# Patient Record
Sex: FEMALE | Race: WHITE
[De-identification: names, ages, dates, MRNs, and addresses within clinical notes are randomized per-mention and may not be internally consistent; named-entity substitution may affect disease eponyms.]

---

## 2019-01-29 ENCOUNTER — HOSPITAL ENCOUNTER (EMERGENCY)
Dept: HOSPITAL 97 - ER | Age: 39
Discharge: HOME | End: 2019-01-29
Payer: SELF-PAY

## 2019-01-29 DIAGNOSIS — J06.9: ICD-10-CM

## 2019-01-29 DIAGNOSIS — J20.8: Primary | ICD-10-CM

## 2019-01-29 DIAGNOSIS — Z88.0: ICD-10-CM

## 2019-01-29 PROCEDURE — 71046 X-RAY EXAM CHEST 2 VIEWS: CPT

## 2019-01-29 PROCEDURE — 99284 EMERGENCY DEPT VISIT MOD MDM: CPT

## 2019-01-29 NOTE — ER
Nurse's Notes                                                                                     

 [facility]                                                                

Name: Eda Reyes                                                                              

Age: 38 yrs                                                                                       

Sex: Female                                                                                       

: 1980                                                                                   

MRN: [id number]                                                                                   

Arrival Date: 2019                                                                          

Time: 13:07                                                                                       

Account#: [id number]                                                                            

Bed 23                                                                                            

Private MD: None, None                                                                            

Diagnosis: Acute Viral Upper Respiratory Infection;Acute Viral Bronchitis                         

                                                                                                  

Presentation:                                                                                     

                                                                                             

13:09 Presenting complaint: Patient states: i have been battling this cough and now i have    tw2 

      this congestion in my chest, +cough, scratchy throat. Transition of care: patient was       

      not received from another setting of care. Onset of symptoms was 2019. Risk     

      Assessment: Do you want to hurt yourself or someone else? Patient reports no desire to      

      harm self or others. Initial Sepsis Screen: Does the patient meet any 2 criteria? No.       

      Patient's initial sepsis screen is negative. Does the patient have a suspected source       

      of infection? No. Patient's initial sepsis screen is negative. Care prior to arrival:       

      None.                                                                                       

13:09 Method Of Arrival: Ambulatory                                                           tw2 

13:09 Acuity: PRAMOD 4                                                                           tw2 

                                                                                                  

Triage Assessment:                                                                                

13:11 General: Appears in no apparent distress. Behavior is calm, cooperative, appropriate    tw2 

      for age. Pain: Complains of pain in throat.                                                 

                                                                                                  

OB/GYN:                                                                                           

13:11 LMP 2019                                                                           tw2 

                                                                                                  

Historical:                                                                                       

- Allergies:                                                                                      

13:11 PENICILLINS;                                                                            tw2 

- Home Meds:                                                                                      

13:11 None [Active];                                                                          tw2 

- PSHx:                                                                                           

13:11 ; Tubal ligation;                                                              tw2 

                                                                                                  

- Immunization history:: Adult Immunizations.                                                     

- Social history:: Smoking status: Patient/guardian denies using tobacco, the patient             

  reports quitting approximately 3 years ago.                                                     

- Ebola Screening: : Patient denies travel to an Ebola-affected area in the 21 days               

  before illness onset.                                                                           

- Family history:: not pertinent.                                                                 

- Hospitalizations: : No recent hospitalization is reported.                                      

                                                                                                  

                                                                                                  

Screenin:52 Abuse screen: Denies threats or abuse. Nutritional screening: No deficits noted.        tl3 

      Tuberculosis screening: No symptoms or risk factors identified. Fall Risk None              

      identified.                                                                                 

                                                                                                  

Assessment:                                                                                       

13:52 General: Appears in no apparent distress. comfortable, slender, well groomed, well      tl3 

      developed, well nourished, Behavior is calm, cooperative, appropriate for age. Pain:        

      Complains of pain in chest with cough. Neuro: No deficits noted. Level of Consciousness     

      is awake, alert, obeys commands, Oriented to person, place, time, situation,                

      Appropriate for age. Cardiovascular: Heart tones S1 S2 present Patient's skin is warm       

      and dry. Respiratory: Reports air hunger Breath sounds are clear bilaterally. GI: No        

      signs and/or symptoms were reported involving the gastrointestinal system. : No signs     

      and/or symptoms were reported regarding the genitourinary system. EENT: No signs and/or     

      symptoms were reported regarding the EENT system. Derm: No signs and/or symptoms            

      reported regarding the dermatologic system. Musculoskeletal: No signs and/or symptoms       

      reported regarding the musculoskeletal system.                                              

14:18 Reassessment: Patient appears in no apparent distress at this time. No changes from     tl3 

      previously documented assessment. Patient and/or family updated on plan of care and         

      expected duration. Pain level reassessed. Patient is alert, oriented x 3, equal             

      unlabored respirations, skin warm/dry/pink. awaiting results of x-ray.                      

14:59 Reassessment: Patient appears in no apparent distress at this time. No changes from     tl3 

      previously documented assessment. Patient and/or family updated on plan of care and         

      expected duration. Pain level reassessed. Patient is alert, oriented x 3, equal             

      unlabored respirations, skin warm/dry/pink.                                                 

                                                                                                  

Vital Signs:                                                                                      

13:11  / 68; Pulse 76; Resp 18; Temp 97.6(TE); Pulse Ox 98% on R/A; Weight 58.97 kg     tw2 

      (R); Height 5 ft. 4 in. (162.56 cm); Pain 0/10;                                             

14:18  / 67; Pulse 74; Resp 18; Pulse Ox 97% on R/A;                                    tl3 

14:59  / 64; Pulse 67; Resp 18; Pulse Ox 99% ;                                          tl3 

13:11 Body Mass Index 22.31 (58.97 kg, 162.56 cm)                                             tw2 

                                                                                                  

ED Course:                                                                                        

13:07 Patient arrived in ED.                                                                  dl4 

13:07 None, None is Private Physician.                                                        dl4 

13:10 Triage completed.                                                                       tw2 

13:12 Arm band placed on.                                                                     tw2 

13:13 Sascha Keita MD is Attending Physician.                                             wa  

13:28 Jackelin Riggs RN is Primary Nurse.                                                     tl3 

13:28 ED physician to see patient. Dr Keita at bedside for assessment.                       tl3 

13:50 Chest Pa And Lat (2 Views) XRAY In Process Unspecified.                                 EDMS

13:52 Patient has correct armband on for positive identification. Bed in low position. Call   tl3 

      light in reach.                                                                             

13:52 No provider procedures requiring assistance completed. Patient did not have IV access   tl3 

      during this emergency room visit.                                                           

14:50 Aurelio Sanford DO is Referral Physician.                                              wa  

                                                                                                  

Administered Medications:                                                                         

13:45 Drug: Albuterol 2.5 mg Route: Inhalation;                                               tl3 

14:21 Follow up: Response: No adverse reaction                                                tl3 

13:46 Drug: AtroVENT Aerosol 0.5 mg Route: Inhalation;                                        tl3 

14:21 Follow up: Response: No adverse reaction                                                tl3 

14:41 Drug: predniSONE 40 mg Route: PO;                                                       tl3 

14:42 Follow up: Response: No adverse reaction                                                tl3 

                                                                                                  

                                                                                                  

Outcome:                                                                                          

14:51 Discharge ordered by MD.                                                                wa  

14:59 Discharged to home ambulatory.                                                          tl3 

14:59 Condition: stable                                                                           

14:59 Discharge instructions given to patient, Instructed on discharge instructions, follow       

      up and referral plans. Demonstrated understanding of instructions, follow-up care,          

      medications, Prescriptions given X 2.                                                       

15:08 Patient left the ED.                                                                    tl3 

                                                                                                  

Signatures:                                                                                       

Dispatcher [employer]                           EDMS                                                 

Melany George RN                          RN   tw2                                                  

Sascha Keita MD MD wa Lowrey, Tammy, RN                       RN   tl3                                                  

Aroldo Gerber                                  dl4                                                  

                                                                                                  

**************************************************************************************************

## 2019-01-29 NOTE — EDPHYS
Physician Documentation                                                                           

 [facility]                                                                

Name: Eda Reyes                                                                              

Age: 38 yrs                                                                                       

Sex: Female                                                                                       

: 1980                                                                                   

MRN: [id number]                                                                                   

Arrival Date: 2019                                                                          

Time: 13:07                                                                                       

Account#: [id number]                                                                            

Bed 23                                                                                            

Private MD: None, None                                                                            

ED Physician Sascha Keita                                                                      

HPI:                                                                                              

                                                                                             

13:40 This 38 yrs old  Female presents to ER via Ambulatory with complaints of       wa  

      Cough, Fever.                                                                               

13:40 The patient or guardian reports cough, that is constant, with no sputum, worse at       wa  

      night.. Onset: The symptoms/episode began/occurred 1 week(s) ago. Severity of symptoms:     

      At their worst the symptoms were moderate, in the emergency department the symptoms are     

      actually worse. Modifying factors: The symptoms are alleviated by nothing, the symptoms     

      are aggravated by nothing. Associated signs and symptoms: Pertinent positives:              

      rhinorrhea, congestion. The patient has not experienced similar symptoms in the past.       

      The patient has not recently seen a physician. taking OTC cough meds.                       

                                                                                                  

OB/GYN:                                                                                           

13:11 LMP 2019                                                                           tw2 

                                                                                                  

Historical:                                                                                       

- Allergies:                                                                                      

13:11 PENICILLINS;                                                                            tw2 

- Home Meds:                                                                                      

13:11 None [Active];                                                                          tw2 

- PSHx:                                                                                           

13:11 ; Tubal ligation;                                                              tw2 

                                                                                                  

- Immunization history:: Adult Immunizations.                                                     

- Social history:: Smoking status: Patient/guardian denies using tobacco, the patient             

  reports quitting approximately 3 years ago.                                                     

- Ebola Screening: : Patient denies travel to an Ebola-affected area in the 21 days               

  before illness onset.                                                                           

- Family history:: not pertinent.                                                                 

- Hospitalizations: : No recent hospitalization is reported.                                      

                                                                                                  

                                                                                                  

ROS:                                                                                              

13:42 Eyes: Negative for injury, pain, redness, and discharge, Neck: Negative for injury,     wa  

      pain, and swelling, Cardiovascular: Negative for chest pain, palpitations, and edema,       

      Abdomen/GI: Negative for abdominal pain, nausea, vomiting, diarrhea, and constipation,      

      Back: Negative for injury and pain, : Negative for injury, bleeding, discharge, and       

      swelling, MS/Extremity: Negative for injury and deformity, Skin: Negative for injury,       

      rash, and discoloration, Neuro: Negative for headache, weakness, numbness, tingling,        

      and seizure, Psych: Negative for depression, anxiety, suicide ideation, homicidal           

      ideation, and hallucinations.                                                               

13:42 Constitutional: Positive for fever.                                                         

13:42 ENT: Positive for rhinorrhea, sinus congestion.                                             

13:42 Respiratory: Positive for cough, with no reported sputum.                                   

                                                                                                  

Exam:                                                                                             

13:42 Head/Face:  Normocephalic, atraumatic. Eyes:  Pupils equal round and reactive to light, wa  

      extra-ocular motions intact.  Lids and lashes normal.  Conjunctiva and sclera are           

      non-icteric and not injected.  Cornea within normal limits.  Periorbital areas with no      

      swelling, redness, or edema. Neck:  Trachea midline, no thyromegaly or masses palpated,     

      and no cervical lymphadenopathy.  Supple, full range of motion without nuchal rigidity,     

      or vertebral point tenderness.  No Meningismus. Chest/axilla:  Normal chest wall            

      appearance and motion.  Nontender with no deformity.  No lesions are appreciated.           

      Cardiovascular:  Regular rate and rhythm with a normal S1 and S2.  No gallops, murmurs,     

      or rubs.  Normal PMI, no JVD.  No pulse deficits. Abdomen/GI:  Soft, non-tender, with       

      normal bowel sounds.  No distension or tympany.  No guarding or rebound.  No evidence       

      of tenderness throughout. Back:  No spinal tenderness.  No costovertebral tenderness.       

      Full range of motion. Skin:  Warm, dry with normal turgor.  Normal color with no            

      rashes, no lesions, and no evidence of cellulitis. MS/ Extremity:  Pulses equal, no         

      cyanosis.  Neurovascular intact.  Full, normal range of motion. Neuro:  Awake and           

      alert, GCS 15, oriented to person, place, time, and situation.  Cranial nerves II-XII       

      grossly intact.  Motor strength 5/5 in all extremities.  Sensory grossly intact.            

      Cerebellar exam normal.  Normal gait. Psych:  Awake, alert, with orientation to person,     

      place and time.  Behavior, mood, and affect are within normal limits.                       

13:42 Constitutional: The patient appears in no acute distress, alert, awake.                     

13:42 ENT: Ear canal(s): are normal, TM's: are normal, Nose: is normal, Examination of the        

      other nostril shows no obvious abnormality, , Posterior pharynx: is normal.                 

13:42 Respiratory: the patient does not display signs of respiratory distress,  Respirations:     

      normal, Breath sounds: wheezing: that is mild, is scattered, noted cough spells with        

      deep inspiration.                                                                           

                                                                                                  

Vital Signs:                                                                                      

13:11  / 68; Pulse 76; Resp 18; Temp 97.6(TE); Pulse Ox 98% on R/A; Weight 58.97 kg     tw2 

      (R); Height 5 ft. 4 in. (162.56 cm); Pain 0/10;                                             

14:18  / 67; Pulse 74; Resp 18; Pulse Ox 97% on R/A;                                    tl3 

14:59  / 64; Pulse 67; Resp 18; Pulse Ox 99% ;                                          tl3 

13:11 Body Mass Index 22.31 (58.97 kg, 162.56 cm)                                             tw2 

                                                                                                  

MDM:                                                                                              

13:13 Patient medically screened.                                                             wa  

13:43 Differential Diagnosis: Other URI symptoms post viral syndrome with possible post nasal wa  

      drip. will r/o pna. nebs, consider steroid burst if CXR neg for pna.                        

14:48 Data reviewed: vital signs, nurses notes. Test interpretation: by ED physician or       wa  

      midlevel provider: CXR negative. Response to treatment: the patient's symptoms have         

      markedly improved after treatment. ED course: will d/c with MDI and short burst of          

      prednisone. first dose given in ED. advised benadryl/cetirizine prn. .                      

                                                                                                  

                                                                                             

13:32 Order name: Chest Pa And Lat (2 Views) XRAY; Complete Time: 14:34                       wa  

                                                                                                  

Administered Medications:                                                                         

13:45 Drug: Albuterol 2.5 mg Route: Inhalation;                                               tl3 

14:21 Follow up: Response: No adverse reaction                                                tl3 

13:46 Drug: AtroVENT Aerosol 0.5 mg Route: Inhalation;                                        tl3 

14:21 Follow up: Response: No adverse reaction                                                tl3 

14:41 Drug: predniSONE 40 mg Route: PO;                                                       tl3 

14:42 Follow up: Response: No adverse reaction                                                tl3 

                                                                                                  

                                                                                                  

Disposition:                                                                                      

19 14:51 Discharged to Home. Impression: Acute Viral Upper Respiratory Infection, Acute     

  Viral Bronchitis.                                                                               

- Condition is Stable.                                                                            

- Discharge Instructions: Acute Bronchitis, Easy-to-Read.                                         

- Prescriptions for Prednisone 20 mg Oral Tablet - take 2 tablets by ORAL route once              

  daily for 4 days; 8 tablet. Albuterol Sulfate 90 mcg/actuation Inhalation - inhale              

  1-2 puff by INHALATION route every 2 hours; 1 Inhaler.                                          

- Medication Reconciliation Form, Thank You Letter, Antibiotic Education, Prescription            

  Opioid Use, Work release form form.                                                             

- Follow up: Aurelio Sanford DO; When: 2 - 3 days; Reason: Recheck today's complaints.           

- Problem is new.                                                                                 

- Symptoms have improved.                                                                         

- Notes: take medication as prescribed. you may take zyrtec for congestion as needed.             

  do not take medication that contain sudafed                                                     

                                                                                                  

                                                                                                  

Signatures:                                                                                       

Dispatcher [employer]                           EDMelany Hargrove, RN                          RN   tw2                                                  

Sascha Keita MD MD   wa                                                   

Jackelin Riggs RN                       RN   tl3                                                  

                                                                                                  

Corrections: (The following items were deleted from the chart)                                    

15:08 14:51 2019 14:51 Discharged to Home. Impression: Acute Viral Upper Respiratory    tl3 

      Infection; Acute Viral Bronchitis. Condition is Stable. Forms are Medication                

      Reconciliation Form, Thank You Letter, Antibiotic Education, Prescription Opioid Use.       

      Follow up: Aurelio Sanford; When: 2 - 3 days; Reason: Recheck today's complaints.            

      Problem is new. Symptoms have improved. wa                                                  

                                                                                                  

**************************************************************************************************

## 2019-01-29 NOTE — RAD REPORT
EXAM DESCRIPTION:  RAD - Chest Pa And Lat (2 Views) - 1/29/2019 1:49 pm

 

CLINICAL HISTORY:  Cough and congestion

 

COMPARISON:  None.

 

TECHNIQUE:  PA and lateral views of the chest were obtained.

 

FINDINGS:  The lungs are clear.   Heart size is normal and central vasculature is within normal limit
s.  No pleural effusion or pneumothorax seen.  No acute bony finding noted.  No aortic abnormality.

 

IMPRESSION:  No acute cardiopulmonary process.

## 2019-06-10 ENCOUNTER — HOSPITAL ENCOUNTER (EMERGENCY)
Dept: HOSPITAL 97 - ER | Age: 39
Discharge: HOME | End: 2019-06-10
Payer: SELF-PAY

## 2019-06-10 DIAGNOSIS — D17.0: Primary | ICD-10-CM

## 2019-06-10 DIAGNOSIS — Z88.0: ICD-10-CM

## 2019-06-10 PROCEDURE — 99282 EMERGENCY DEPT VISIT SF MDM: CPT

## 2019-06-10 NOTE — EDPHYS
Physician Documentation                                                                           

 [facility]                                                                 

Name: Eda Reyes                                                                              

Age: 39 yrs                                                                                       

Sex: Female                                                                                       

: 1980                                                                                   

MRN: [id number]                                                                                   

Arrival Date: 06/10/2019                                                                          

Time: 13:38                                                                                       

Account#: [id number]                                                                            

Bed 11                                                                                            

Private MD: None, None                                                                            

ED Physician Ubaldo Greer                                                                      

HPI:                                                                                              

06/10                                                                                             

14:44 This 39 yrs old  Female presents to ER via Ambulatory with complaints of Cyst. [facility] 

14:44 the patient presents with a swollen area of the left ear. Onset: The symptoms/episode   jmm 

      began/occurred 3 month(s) ago. Possible cause(s): unknown. This is a 39 year old female     

      with no chronic medical conditions that presents to the ED with complaints of swelling      

      to her left cheek infront of her left ear. Swelling has been ongoing for 3 months.          

      patient states developing increased pain today. patient denies fever. .                     

                                                                                                  

OB/GYN:                                                                                           

14:04 LMP 2019                                                                           aj  

                                                                                                  

Historical:                                                                                       

- Allergies:                                                                                      

14:04 PENICILLINS;                                                                            aj  

                                                                                                  

- Immunization history:: Adult Immunizations not up to date.                                      

- Social history:: Smoking status: unknown.                                                       

- Ebola Screening: : Patient negative for fever greater than or equal to 101.5 degrees            

  Fahrenheit, and additional compatible Ebola Virus Disease symptoms Patient denies               

  exposure to infectious person Patient denies travel to an Ebola-affected area in the            

  21 days before illness onset No symptoms or risks identified at this time.                      

                                                                                                  

                                                                                                  

ROS:                                                                                              

14:44 Constitutional: Negative for fever, chills, and weight loss.                            [facility] 

14:44 Neck: Negative for injury, pain, and swelling, Cardiovascular: Negative for chest pain,     

      palpitations, and edema, Respiratory: Negative for shortness of breath, cough,              

      wheezing, and pleuritic chest pain.                                                         

14:44 ENT: Positive for ear pain.                                                                 

14:44 Skin: Positive for swelling.                                                                

14:44 All other systems are negative.                                                             

                                                                                                  

Exam:                                                                                             

14:44 Eyes:  EOMI, no conjunctival erythema appreciated Neck:  Trachea midline, Supple        [facility] 

      Chest/axilla:  Normal chest wall appearance and motion.   Cardiovascular:  Regular rate     

      and rhythm.  No edema appreciated Respiratory:  Normal respirations, no respiratory         

      distress appreciated Abdomen/GI:  Non distended, soft Back:  Normal ROM                     

14:44 Constitutional: The patient appears in no acute distress, alert, awake.                     

14:44 Head/face: swelling noted to the left anterior auricular region, soft, no erythema or       

      induration appreciated.  .                                                                  

14:44 Skin: soft rubber lesion noted to the left anterior auricular region, no erythema or        

      induration appreciated.                                                                     

14:44 Neuro: Orientation: is normal, Mentation: is normal, Memory: is normal.                     

14:44 Psych: Behavior/mood is pleasant, cooperative.                                              

                                                                                                  

Vital Signs:                                                                                      

14:04  / 53; Pulse 79; Resp 18; Temp 98.1; Pulse Ox 98% on R/A; Weight 58.97 kg; Height aj  

      5 ft. 3 in. (160.02 cm);                                                                    

14:04 Body Mass Index 23.03 (58.97 kg, 160.02 cm)                                             aj  

                                                                                                  

MDM:                                                                                              

14:43 Patient medically screened.                                                             [facility] 

14:44 Data reviewed: vital signs, nurses notes. Counseling: I had a detailed discussion with  michoacano 

      the patient and/or guardian regarding: the historical points, exam findings, and any        

      diagnostic results supporting the discharge/admit diagnosis, the need for outpatient        

      follow up, to return to the emergency department if symptoms worsen or persist or if        

      there are any questions or concerns that arise at home. ED course: PE findings              

      consistent with Lipoma. patient advised to follow up with ENT for further evaluation.       

      Patient is otherwise given strict return precautions. Patient understood and agrees         

      with the plan of care. .                                                                    

                                                                                                  

Administered Medications:                                                                         

No medications were administered                                                                  

                                                                                                  

                                                                                                  

Disposition:                                                                                      

21:48 Co-signature as Attending Physician, Ubaldo Greer MD available for consultation at    ps1 

      all times .                                                                                 

                                                                                                  

Disposition:                                                                                      

06/10/19 14:52 Discharged to Home. Impression: Benign lipomatous neoplasm.                        

- Condition is Stable.                                                                            

- Discharge Instructions: Lipoma.                                                                 

- Prescriptions for Ultracet 37.5- 325 mg Oral Tablet - take 1 tablet by ORAL route               

  every 6 hours - for up to 5 days; do not exceed 8 tablets per day.; 12 tablet.                  

  Bactrim - 160 mg Oral Tablet - take 1 tablet by ORAL route every 12 hours for 5           

  days; 10 tablet.                                                                                

- Medication Reconciliation Form, Thank You Letter, Antibiotic Education, Prescription            

  Opioid Use, Work release form form.                                                             

- Follow up: Roxanne Black MD; When: 2 - 3 days; Reason: Recheck today's                    

  complaints, Continuance of care, Re-evaluation by your physician.                               

                                                                                                  

                                                                                                  

                                                                                                  

Signatures:                                                                                       

Shelly Luevano RN                       RN   Dante Garcia PA PA jmm Williams, Irene, RN                     RN   iw                                                   

Ubaldo Greer MD MD   ps1                                                  

                                                                                                  

Corrections: (The following items were deleted from the chart)                                    

15:06 14:52 06/10/2019 14:52 Discharged to Home. Impression: Benign lipomatous neoplasm.      iw  

      Condition is Stable. Forms are Medication Reconciliation Form, Thank You Letter,            

      Antibiotic Education, Prescription Opioid Use. Follow up: Roxanne Black; When: 2 -      

      3 days; Reason: Recheck today's complaints, Continuance of care, Re-evaluation by your      

      physician. [facility]                                                                              

                                                                                                  

**************************************************************************************************

## 2019-06-10 NOTE — ER
Nurse's Notes                                                                                     

 [facility]                                                                 

Name: Eda Reyes                                                                              

Age: 39 yrs                                                                                       

Sex: Female                                                                                       

: 1980                                                                                   

MRN: [id number]                                                                                   

Arrival Date: 06/10/2019                                                                          

Time: 13:38                                                                                       

Account#: [id number]                                                                            

Bed 11                                                                                            

Private MD: None, None                                                                            

Diagnosis: Benign lipomatous neoplasm                                                             

                                                                                                  

Presentation:                                                                                     

06/10                                                                                             

14:03 Presenting complaint: Patient states: Cyst near left ear for 2-3 months that has been   aj  

      painful for several days. No redness or drainage noted. Transition of care: patient was     

      not received from another setting of care. Onset of symptoms was 2019. Risk           

      Assessment: Do you want to hurt yourself or someone else? Patient reports no desire to      

      harm self or others. Initial Sepsis Screen: Does the patient meet any 2 criteria? No.       

      Patient's initial sepsis screen is negative. Does the patient have a suspected source       

      of infection? No. Patient's initial sepsis screen is negative. Care prior to arrival:       

      None.                                                                                       

14:03 Method Of Arrival: Ambulatory                                                           aj  

14:03 Acuity: PRAMOD 4                                                                           aj  

                                                                                                  

Triage Assessment:                                                                                

14:04 General: Appears in no apparent distress. uncomfortable, Behavior is calm, cooperative, aj  

      appropriate for age. Pain: Complains of pain in left ear. Neuro: Level of Consciousness     

      is awake, alert, obeys commands, Oriented to person, place, time, situation,                

      Appropriate for age. Respiratory: Airway is patent Respiratory effort is even,              

      unlabored, Respiratory pattern is regular, symmetrical. Derm: Skin is intact, is            

      healthy with good turgor, Skin is pink, warm \T\ dry. normal.                               

                                                                                                  

OB/GYN:                                                                                           

14:04 [facility] 2019                                                                           aj  

                                                                                                  

Historical:                                                                                       

- Allergies:                                                                                      

14:04 PENICILLINS;                                                                            aj  

                                                                                                  

- Immunization history:: Adult Immunizations not up to date.                                      

- Social history:: Smoking status: unknown.                                                       

- Ebola Screening: : Patient negative for fever greater than or equal to 101.5 degrees            

  Fahrenheit, and additional compatible Ebola Virus Disease symptoms Patient denies               

  exposure to infectious person Patient denies travel to an Ebola-affected area in the            

  21 days before illness onset No symptoms or risks identified at this time.                      

                                                                                                  

                                                                                                  

Screenin:48 Abuse screen: Denies threats or abuse. Denies injuries from another. Nutritional        iw  

      screening: No deficits noted. Tuberculosis screening: No symptoms or risk factors           

      identified. Fall Risk None identified.                                                      

                                                                                                  

Assessment:                                                                                       

14:48 General: Appears in no apparent distress. comfortable, Behavior is calm, cooperative.   iw  

      Neuro: Level of Consciousness is awake, alert, obeys commands, Oriented to person,          

      place, time, situation, Moves all extremities. Full function. Cardiovascular: Patient's     

      skin is warm and dry. Respiratory: Respiratory effort is even, unlabored, Respiratory       

      pattern is regular, symmetrical. Derm: Skin is intact, is healthy with good turgor.         

      Musculoskeletal: Range of motion: intact in all extremities.                                

                                                                                                  

Vital Signs:                                                                                      

14:04  / 53; Pulse 79; Resp 18; Temp 98.1; Pulse Ox 98% on R/A; Weight 58.97 kg; Height aj  

      5 ft. 3 in. (160.02 cm);                                                                    

14:04 Body Mass Index 23.03 (58.97 kg, 160.02 cm)                                               

                                                                                                  

ED Course:                                                                                        

13:38 Patient arrived in ED.                                                                  tw3 

13:38 None, None is Private Physician.                                                        tw3 

14:04 Triage completed.                                                                       aj  

14:04 Arm band placed on left wrist.                                                          aj  

14:37 Dante Pace PA is PHCP.                                                              [facility] 

14:37 Ubaldo Greer MD is Attending Physician.                                             [facility] 

14:48 Kortney Treviño, RN is Primary Nurse.                                                   iw  

14:49 No provider procedures requiring assistance completed. Patient did not have IV access   iw  

      during this emergency room visit.                                                           

14:51 Roxanne Black MD is Referral Physician.                                           [facility] 

15:00 Patient has correct armband on for positive identification.                             iw  

                                                                                                  

Administered Medications:                                                                         

No medications were administered                                                                  

                                                                                                  

                                                                                                  

Outcome:                                                                                          

14:52 Discharge ordered by MD.                                                                [facility] 

15:05 Discharged to home ambulatory.                                                          iw  

15:05 Condition: good                                                                             

15:05 Discharge instructions given to patient, Instructed on discharge instructions, follow       

      up and referral plans. medication usage, Demonstrated understanding of instructions,        

      follow-up care, medications, Prescriptions given X 2.                                       

15:06 Patient left the ED.                                                                      

                                                                                                  

Signatures:                                                                                       

Shelly Luevano, RN                       Dante Jasmine PA PA jmm Williams, Irene, RN                     RN   Dahlia Kaufman                                    tw3                                                  

                                                                                                  

**************************************************************************************************

## 2019-09-06 ENCOUNTER — HOSPITAL ENCOUNTER (EMERGENCY)
Dept: HOSPITAL 97 - ER | Age: 39
Discharge: HOME | End: 2019-09-06
Payer: SELF-PAY

## 2019-09-06 DIAGNOSIS — J06.9: Primary | ICD-10-CM

## 2019-09-06 PROCEDURE — 99282 EMERGENCY DEPT VISIT SF MDM: CPT

## 2019-09-06 NOTE — ER
Nurse's Notes                                                                                     

 [facility]                                                                 

Name: Eda Reyes                                                                              

Age: 39 yrs                                                                                       

Sex: Female                                                                                       

: 1980                                                                                   

MRN: [id number]                                                                                   

Arrival Date: 2019                                                                          

Time: 18:04                                                                                       

Account#: [id number]                                                                            

Bed 12                                                                                            

Private MD:                                                                                       

Diagnosis: Acute upper respiratory infection, unspecified                                         

                                                                                                  

Presentation:                                                                                     

                                                                                             

18:27 Presenting complaint: Patient states: Productive cough, sneezing for the past 3 days.   aj1 

      Reports subjective fever last night. Denies SOB. Transition of care: patient was not        

      received from another setting of care. Onset of symptoms was 2019. Risk           

      Assessment: Do you want to hurt yourself or someone else? Patient reports no desire to      

      harm self or others. Initial Sepsis Screen: Does the patient meet any 2 criteria? No.       

      Patient's initial sepsis screen is negative. Does the patient have a suspected source       

      of infection? Yes: Productive cough/pneumonia. Care prior to arrival: None.                 

18:27 Method Of Arrival: Ambulatory                                                           aj1 

18:27 Acuity: PRAMOD 4                                                                           aj1 

                                                                                                  

Triage Assessment:                                                                                

18:28 General: Appears in no apparent distress. comfortable, Behavior is calm, cooperative,   aj1 

      appropriate for age. Pain: Denies pain. Neuro: Level of Consciousness is awake, alert,      

      obeys commands. Cardiovascular: Patient's skin is warm and dry. Respiratory: Airway is      

      patent Respiratory effort is even, unlabored, Respiratory pattern is regular,               

      symmetrical.                                                                                

                                                                                                  

OB/GYN:                                                                                           

18:28 LMP 2019                                                                           aj1 

                                                                                                  

Historical:                                                                                       

- Allergies:                                                                                      

18:28 PENICILLINS;                                                                            aj1 

- PMHx:                                                                                           

18:28 None;                                                                                   aj1 

                                                                                                  

- Immunization history:: Adult Immunizations unknown.                                             

- Ebola Screening: : Patient denies travel to an Ebola-affected area in the 21 days               

  before illness onset.                                                                           

- Social history:: Smoking status: Patient/guardian denies using tobacco.                         

                                                                                                  

                                                                                                  

Screenin:50 Abuse screen: Denies threats or abuse. Nutritional screening: No deficits noted.        cr4 

      Tuberculosis screening: No symptoms or risk factors identified. Fall Risk None              

      identified.                                                                                 

                                                                                                  

Assessment:                                                                                       

20:56 General: Appears uncomfortable, Behavior is calm, cooperative. Neuro: Denies weakness   cr4 

      blurred vision difficulty swallowing, numbness. Cardiovascular: Denies chest pain,          

      lightheadedness, palpitations, shortness of breath. Respiratory: Reports cough that is      

      productive, persistent pain with cough Airway is patent Trachea midline Respiratory         

      effort is even, unlabored, Respiratory pattern is regular, Breath sounds are clear          

      bilaterally. the patient has moderate shortness of breath. GI: Patient currently denies     

      abdominal pain, nausea, vomiting. : No deficits noted. Denies. EENT: Nares with           

      drainage noted bilaterally. Derm: No deficits noted. Musculoskeletal: No deficits noted.    

                                                                                                  

Vital Signs:                                                                                      

18:28  / 66; Pulse 89; Resp 16; Temp 98.5; Pulse Ox 99% on R/A; Weight 60.78 kg (R);    aj1 

      Height 5 ft. 3 in. (160.02 cm) (R); Pain 0/10;                                              

20:50  / 48; Pulse 84; Resp 20; Temp 97.8; Pulse Ox 98% ; Pain 2/10;                    cr4 

18:28 Body Mass Index 23.74 (60.78 kg, 160.02 cm)                                             [facility] 

                                                                                                  

ED Course:                                                                                        

18:04 Patient arrived in ED.                                                                  as  

18:28 Triage completed.                                                                       1 

18:28 Arm band placed on Patient placed in waiting room, Patient notified of wait time.       [facility] 

20:21 Scott Guerin PA is PHCP.                                                                cp  

20:21 Napoleon Blackburn MD is Attending Physician.                                              henri  

20:26 Rose Antony is Primary Nurse.                                                           

20:50 Patient has correct armband on for positive identification.                             cr4 

20:50 No provider procedures requiring assistance completed. Patient did not have IV access   cr4 

      during this emergency room visit.                                                           

                                                                                                  

Administered Medications:                                                                         

No medications were administered                                                                  

                                                                                                  

                                                                                                  

Outcome:                                                                                          

20:29 Discharge ordered by MD.                                                                cp  

20:50 Discharged to home ambulatory.                                                          cr4 

20:50 Condition: good                                                                             

20:50 Discharge instructions given to patient, Instructed on discharge instructions,              

      medication usage, Demonstrated understanding of instructions, follow-up care,               

      medications, Prescriptions given X 1.                                                       

20:55 Patient left the ED.                                                                    cr4 

                                                                                                  

Signatures:                                                                                       

Bel Love RN                     RN   aj1                                                  

Consuelo Knott Claudia, RN                       RN   cr4                                                  

Scott Guerin PA PA cp Habalo, Winsy                                                                                   

                                                                                                  

**************************************************************************************************

## 2019-09-06 NOTE — EDPHYS
Physician Documentation                                                                           

 [facility]                                                                 

Name: Eda Reyes                                                                              

Age: 39 yrs                                                                                       

Sex: Female                                                                                       

: 1980                                                                                   

MRN: [id number]                                                                                   

Arrival Date: 2019                                                                          

Time: 18:04                                                                                       

Account#: [id number]                                                                            

Bed 12                                                                                            

Private MD:                                                                                       

ED Physician Napoleon Blackburn                                                                       

HPI:                                                                                              

                                                                                             

20:25 This 39 yrs old  Female presents to ER via Ambulatory with complaints of Cold  cp  

      Symptoms.                                                                                   

20:25 The patient or guardian reports cough, with productive sputum.                          cp  

20:25 Onset: The symptoms/episode began/occurred 3 day(s) ago. Associated signs and symptoms: cp  

      Pertinent positives: fever last night, sneezing, Pertinent negatives: ear ache, sore        

      throat, vomiting. Severity of symptoms: in the emergency department the symptoms are        

      unchanged despite home interventions.                                                       

                                                                                                  

OB/GYN:                                                                                           

18:28 LMP 2019                                                                           aj1 

                                                                                                  

Historical:                                                                                       

- Allergies:                                                                                      

18:28 PENICILLINS;                                                                            aj1 

- PMHx:                                                                                           

18:28 None;                                                                                   aj1 

                                                                                                  

- Immunization history:: Adult Immunizations unknown.                                             

- Ebola Screening: : Patient denies travel to an Ebola-affected area in the 21 days               

  before illness onset.                                                                           

- Social history:: Smoking status: Patient/guardian denies using tobacco.                         

                                                                                                  

                                                                                                  

ROS:                                                                                              

20:25 Eyes: Negative for injury, pain, redness, and discharge.                                cp  

20:25 Constitutional: Negative for fever, poor PO intake.                                         

20:25 ENT: Negative for drainage from ear(s), ear pain, sore throat, difficulty swallowing,       

      difficulty handling secretions.                                                             

20:25 Respiratory: Positive for cough, with no reported sputum.                                   

20:25 Abdomen/GI: Negative for abdominal pain, nausea, vomiting, and diarrhea.                    

20:25 Skin: Negative for rash.                                                                    

20:25 Neuro: Negative for altered mental status, headache, weakness.                              

20:25 All other systems are negative.                                                             

                                                                                                  

Exam:                                                                                             

20:26 Head/Face:  Normocephalic, atraumatic.                                                  cp  

20:26 Constitutional: The patient appears in no acute distress, alert, awake, non-toxic, well     

      developed, well nourished.                                                                  

20:26 Eyes: Periorbital structures: appear normal, Conjunctiva: normal, no exudate, no            

      injection, Lids and lashes: appear normal, bilaterally.                                     

20:26 ENT: External ear(s): are unremarkable, Ear canal(s): are normal, clear, TM's:              

      dullness, bilaterally, Nose: is normal, Mouth: is normal, Posterior pharynx: is normal,     

      airway is patent, no erythema, no exudate.                                                  

20:26 Neck: ROM/movement: is normal, is supple, without pain, no range of motions                 

      limitations, no meningismus, no nuchal rigidity.                                            

20:26 Chest/axilla: Inspection: normal.                                                           

20:26 Cardiovascular: Rate: normal, Rhythm: regular.                                              

20:26 Respiratory: the patient does not display signs of respiratory distress,  Respirations:     

      normal, no use of accessory muscles, no retractions, no splinting, no tachypnea,            

      labored breathing, is not present, Breath sounds: decreased breath sounds, are not          

      appreciated, stridor, is not appreciated, + upper airway congestion. wheezing: is not       

      appreciated.                                                                                

20:26 Abdomen/GI: Exam negative for discomfort, distension, guarding, Inspection: abdomen         

      appears normal.                                                                             

                                                                                                  

Vital Signs:                                                                                      

18:28  / 66; Pulse 89; Resp 16; Temp 98.5; Pulse Ox 99% on R/A; Weight 60.78 kg (R);    aj1 

      Height 5 ft. 3 in. (160.02 cm) (R); Pain 0/10;                                              

20:50  / 48; Pulse 84; Resp 20; Temp 97.8; Pulse Ox 98% ; Pain 2/10;                    cr4 

18:28 Body Mass Index 23.74 (60.78 kg, 160.02 cm)                                             aj1 

                                                                                                  

MDM:                                                                                              

20:28 Antibiotic administration: Not indicated, the patient does not have an appreciated      cp  

      infiltrate. Data reviewed: vital signs, nurses notes.                                       

20:29 Patient medically screened.                                                             cp  

                                                                                                  

Administered Medications:                                                                         

No medications were administered                                                                  

                                                                                                  

                                                                                                  

Disposition:                                                                                      

09                                                                                             

06:42 Co-signature as Attending Physician, Napoleon Blackburn MD.                                    

                                                                                                  

Disposition:                                                                                      

19 20:29 Discharged to Home. Impression: Acute upper respiratory infection, unspecified.    

- Condition is Stable.                                                                            

- Discharge Instructions: Upper Respiratory Infection, Adult, Form - Excuse from Work,            

  School, or Physical Activity.                                                                   

- Prescriptions for Tessalon Perles 100 mg Oral Capsule - take 2 capsule by ORAL route            

  every 8 hours As needed; 20 capsule.                                                            

- Medication Reconciliation Form, Thank You Letter, Antibiotic Education, Prescription            

  Opioid Use form.                                                                                

- Follow up: Private Physician; When: 2 - 3 days; Reason: Worsening of condition.                 

- Problem is new.                                                                                 

- Symptoms have improved.                                                                         

                                                                                                  

                                                                                                  

                                                                                                  

Signatures:                                                                                       

Bel Love RN                     RN   aj1                                                  

Philly Alfaro RN                       RN   cr4                                                  

Scott Guerin PA PA cp Starr, Gregory, MD MD   gs                                                   

                                                                                                  

Corrections: (The following items were deleted from the chart)                                    

                                                                                             

20:55 20:29 2019 20:29 Discharged to Home. Impression: Acute upper respiratory          cr4 

      infection, unspecified. Condition is Stable. Forms are Medication Reconciliation Form,      

      Thank You Letter, Antibiotic Education, Prescription Opioid Use. Follow up: Private         

      Physician; When: 2 - 3 days; Reason: Worsening of condition. Problem is new. Symptoms       

      have improved. cp                                                                           

                                                                                                  

**************************************************************************************************

## 2019-11-02 ENCOUNTER — HOSPITAL ENCOUNTER (EMERGENCY)
Dept: HOSPITAL 97 - ER | Age: 39
Discharge: HOME | End: 2019-11-02
Payer: COMMERCIAL

## 2019-11-02 VITALS — SYSTOLIC BLOOD PRESSURE: 96 MMHG | OXYGEN SATURATION: 97 % | DIASTOLIC BLOOD PRESSURE: 69 MMHG

## 2019-11-02 DIAGNOSIS — M25.511: Primary | ICD-10-CM
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
## 2019-11-02 PROCEDURE — 99284 EMERGENCY DEPT VISIT MOD MDM: CPT
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
## 2019-11-02 NOTE — RAD REPORT
EXAM DESCRIPTION:  RAD - Shoulder  Right 2 View - 11/2/2019 7:18 am

 

CLINICAL HISTORY:  Right shoulder pain

 

FINDINGS:  Mild elevation of the distal clavicle with respect to the acromion .

 

This probably is secondary to positioning . A mild sprain is considered less likely. The AC joint spa
ce is upper limits normal.

 

No fracture

## 2020-01-12 ENCOUNTER — HOSPITAL ENCOUNTER (EMERGENCY)
Dept: HOSPITAL 97 - ER | Age: 40
Discharge: HOME | End: 2020-01-12
Payer: COMMERCIAL

## 2020-01-12 VITALS — OXYGEN SATURATION: 100 %

## 2020-01-12 VITALS — TEMPERATURE: 98.5 F | DIASTOLIC BLOOD PRESSURE: 62 MMHG | SYSTOLIC BLOOD PRESSURE: 109 MMHG

## 2020-01-12 DIAGNOSIS — Z88.0: ICD-10-CM

## 2020-01-12 DIAGNOSIS — J06.9: Primary | ICD-10-CM

## 2020-01-12 PROCEDURE — 87804 INFLUENZA ASSAY W/OPTIC: CPT

## 2020-01-12 PROCEDURE — 99283 EMERGENCY DEPT VISIT LOW MDM: CPT

## 2020-01-12 NOTE — ER
Nurse's Notes                                                                                     

 [facility]                                                                 

Name: Eda Reyes                                                                              

Age: 39 yrs                                                                                       

Sex: Female                                                                                       

: 1980                                                                                   

MRN: [id number]                                                                                   

Arrival Date: 2020                                                                          

Time: 14:08                                                                                       

Account#: [id number]                                                                            

Bed 24                                                                                            

Private MD:                                                                                       

Diagnosis: Acute upper respiratory infection, unspecified                                         

                                                                                                  

Presentation:                                                                                     

                                                                                             

14:19 Presenting complaint: Patient states: cough and congestion x 1 week. Pt reports that    ss  

      she began vomiting last night because of the phlegm is settling in her stomach. Pt          

      states she just wants a work note and to make sure she does not have pneumonia.             

      Transition of care: patient was not received from another setting of care. Resp             

      Distress? No respiratory distress is noted at this time. Onset of symptoms was 2020. Risk Assessment: Do you want to hurt yourself or someone else? Patient            

      reports no desire to harm self or others. Initial Sepsis Screen: Does the patient meet      

      any 2 criteria? No. Patient's initial sepsis screen is negative. Does the patient have      

      a suspected source of infection? No. Patient's initial sepsis screen is negative. Care      

      prior to arrival: None.                                                                     

14:19 Method Of Arrival: Ambulatory                                                           ss  

14:19 Acuity: PRAMOD 4                                                                           ss  

                                                                                                  

OB/GYN:                                                                                           

15:03 lmp -unknown                                                                            mg2 

                                                                                                  

Historical:                                                                                       

- Allergies:                                                                                      

14:23 PENICILLINS;                                                                            ss  

- Home Meds:                                                                                      

14:23 None [Active];                                                                          ss  

- PMHx:                                                                                           

14:23 None;                                                                                   ss  

- PSHx:                                                                                           

14:23 Tubal ligation; ;                                                              ss  

                                                                                                  

- Immunization history:: Adult Immunizations up to date.                                          

- Social history:: Smoking status: Patient/guardian denies using tobacco, the patient             

  reports quitting approximately 3 years ago.                                                     

- Ebola Screening: : Patient denies exposure to infectious person Patient denies travel           

  to an Ebola-affected area in the 21 days before illness onset.                                  

                                                                                                  

                                                                                                  

Screenin:22 Abuse screen: Denies threats or abuse. Denies injuries from another. Nutritional        mg2 

      screening: No deficits noted. Tuberculosis screening: No symptoms or risk factors           

      identified. Fall Risk None identified.                                                      

                                                                                                  

Assessment:                                                                                       

14:24 General: Appears in no apparent distress. comfortable, Behavior is calm, cooperative.   mg2 

      Pain: Denies pain. Neuro: Level of Consciousness is awake, alert, obeys commands,           

      Oriented to person, place, time, situation. Cardiovascular: Capillary refill < 3            

      seconds Patient's skin is warm and dry. Respiratory: Airway is patent Respiratory           

      effort is even, unlabored, Respiratory pattern is regular, symmetrical, Breath sounds       

      are clear bilaterally. in mediastinum, right upper lobe, left upper lobe, right middle      

      lobe, left lower lobe and right lower lobe. Respiratory: Reports cough that is. GI:         

      Reports vomiting. : No signs and/or symptoms were reported regarding the                  

      genitourinary system. EENT: Reports nasal congestion. Derm: Skin is intact, is healthy      

      with good turgor, Skin is pink, warm \T\ dry. normal. Musculoskeletal: Circulation,         

      motion, and sensation intact. Capillary refill < 3 seconds.                                 

15:02 Reassessment: No changes from previously documented assessment.                         mg2 

                                                                                                  

Vital Signs:                                                                                      

14:23  / 86; Resp 18; Temp 98.6; Pulse Ox 100% ;                                        mg2 

15:02  / 62; Pulse 78; Resp 18; Temp 98.5; Pulse Ox 100% on R/A;                        mg2 

                                                                                                  

ED Course:                                                                                        

14:08 Patient arrived in ED.                                                                  ag5 

14:09 [facility]                                                        kb  

14:09 Kayode Tran MD is Attending Physician.                                                kb  

14:19 Zane Alvarez, KAREN is Primary Nurse.                                                  mg2 

14:22 Patient has correct armband on for positive identification.                             mg2 

14:23 Triage completed.                                                                       ss  

14:23 Door closed.                                                                            mg2 

14:23 Arm band placed on right wrist.                                                         ss  

14:23 No provider procedures requiring assistance completed. Patient did not have IV access   mg2 

      during this emergency room visit.                                                           

14:24 Flu and/or RSV swab sent to lab.                                                        mg2 

                                                                                                  

Administered Medications:                                                                         

No medications were administered                                                                  

                                                                                                  

                                                                                                  

Outcome:                                                                                          

14:56 Discharge ordered by MD.                                                                kb  

15:02 Discharged to home ambulatory.                                                          mg2 

15:02 Condition: stable                                                                           

15:02 Discharge instructions given to patient, Instructed on discharge instructions, follow       

      up and referral plans. Demonstrated understanding of instructions, follow-up care.          

15:04 Patient left the ED.                                                                    mg2 

                                                                                                  

Signatures:                                                                                       

Rosy Mccray FNP-C FNP-Ckb Smirch, Shelby, RN                      RN                                                      

Zane Alvarez, KAREN                    RN   mg2                                                  

Nolan Anguiano                                ag5                                                  

                                                                                                  

**************************************************************************************************

## 2020-03-09 ENCOUNTER — HOSPITAL ENCOUNTER (EMERGENCY)
Dept: HOSPITAL 97 - ER | Age: 40
Discharge: HOME | End: 2020-03-09
Payer: COMMERCIAL

## 2020-03-09 VITALS — TEMPERATURE: 98 F | SYSTOLIC BLOOD PRESSURE: 125 MMHG | DIASTOLIC BLOOD PRESSURE: 78 MMHG

## 2020-03-09 VITALS — OXYGEN SATURATION: 100 %

## 2020-03-09 DIAGNOSIS — Z88.0: ICD-10-CM

## 2020-03-09 DIAGNOSIS — N94.6: Primary | ICD-10-CM

## 2020-03-09 PROCEDURE — 96372 THER/PROPH/DIAG INJ SC/IM: CPT

## 2020-03-09 PROCEDURE — 99283 EMERGENCY DEPT VISIT LOW MDM: CPT

## 2020-03-09 PROCEDURE — 81003 URINALYSIS AUTO W/O SCOPE: CPT

## 2020-03-09 NOTE — EDPHYS
Physician Documentation                                                                           

 [facility]                                                                 

Name: Eda Reyes                                                                              

Age: 39 yrs                                                                                       

Sex: Female                                                                                       

: 1980                                                                                   

MRN: [id number]                                                                                   

Arrival Date: 2020                                                                          

Time: 19:07                                                                                       

Account#: [id number]                                                                            

Bed 15                                                                                            

Private MD:                                                                                       

ED Physician Wade Chandler                                                                     

HPI:                                                                                              

03/10                                                                                             

06:52 This 39 yrs old  Female presents to ER via Ambulatory with complaints of       tw4 

      Vaginal Bleeding.                                                                           

06:52 The patient presents with vaginal bleeding that is moderate. Onset: The                 tw4 

      symptoms/episode began/occurred yesterday. Modifying factors: The symptoms are              

      alleviated by nothing, the symptoms are aggravated by nothing. The patient has not          

      experienced similar symptoms in the past.                                                   

                                                                                                  

OB/GYN:                                                                                           

                                                                                             

19:38 LMP 3/9/2020                                                                            bb  

                                                                                                  

Historical:                                                                                       

- Allergies:                                                                                      

19:38 PENICILLINS;                                                                            bb  

- Home Meds:                                                                                      

19:38 None [Active];                                                                          bb  

- PMHx:                                                                                           

19:38 None;                                                                                   bb  

- PSHx:                                                                                           

19:38 Tubal ligation; ;                                                              bb  

                                                                                                  

- Immunization history:: Adult Immunizations up to date.                                          

- Social history:: Smoking status: Patient denies any tobacco usage or history of.                

                                                                                                  

                                                                                                  

ROS:                                                                                              

03/10                                                                                             

06:52  Positive for vaginal bleeding.                                                       tw4 

      Constitutional: Negative for fever, chills, and weight loss, Eyes: Negative for injury,     

      pain, redness, and discharge, Cardiovascular: Negative for chest pain, palpitations,        

      and edema, Respiratory: Negative for shortness of breath, cough, wheezing, and              

      pleuritic chest pain, Abdomen/GI: Negative for abdominal pain, nausea, vomiting,            

      diarrhea, and constipation, MS/Extremity: Negative for injury and deformity, Skin:          

      Negative for injury, rash, and discoloration.                                               

                                                                                                  

Exam:                                                                                             

06:52 Constitutional:  This is a well developed, well nourished patient who is awake, alert,  tw4 

      and in no acute distress. Head/Face:  Normocephalic, atraumatic. Eyes:  Pupils equal        

      round and reactive to light, extra-ocular motions intact.  Lids and lashes normal.          

      Conjunctiva and sclera are non-icteric and not injected.  Cornea within normal limits.      

      Periorbital areas with no swelling, redness, or edema. Chest/axilla:  Normal chest wall     

      appearance and motion.  Nontender with no deformity.  No lesions are appreciated.           

      Cardiovascular:  Regular rate and rhythm with a normal S1 and S2.  No gallops, murmurs,     

      or rubs.  Normal PMI, no JVD.  No pulse deficits. Respiratory:  Lungs have equal breath     

      sounds bilaterally, clear to auscultation and percussion.  No rales, rhonchi or wheezes     

      noted.  No increased work of breathing, no retractions or nasal flaring. Abdomen/GI:        

      Soft, non-tender, with normal bowel sounds.  No distension or tympany.  No guarding or      

      rebound.  No evidence of tenderness throughout. MS/ Extremity:  Pulses equal, no            

      cyanosis.  Neurovascular intact.  Full, normal range of motion. Neuro:  Awake and           

      alert, GCS 15, oriented to person, place, time, and situation.  Cranial nerves II-XII       

      grossly intact.  Motor strength 5/5 in all extremities.  Sensory grossly intact.            

      Cerebellar exam normal.  Normal gait.                                                       

                                                                                                  

Vital Signs:                                                                                      

                                                                                             

19:35  / 79; Pulse 86; Resp 16 S; Temp 98.8(O); Pulse Ox 100% on R/A; Weight 63.5 kg    bb  

      (R); Height 5 ft. 3 in. (160.02 cm) (R); Pain 7/10;                                         

20:40  / 78; Pulse 80; Resp 18; Temp 98; Pulse Ox 100% on R/A;                          mg2 

19:35 Body Mass Index 24.80 (63.50 kg, 160.02 cm)                                             bb  

                                                                                                  

MDM:                                                                                              

20:21 Patient medically screened.                                                             tw4 

03/10                                                                                             

06:52 Data reviewed: vital signs, nurses notes. Data interpreted: Pulse oximetry:             tw4 

      Interpretation: normal. Counseling: I had a detailed discussion with the patient and/or     

      guardian regarding: the historical points, exam findings, and any diagnostic results        

      supporting the discharge/admit diagnosis. Medication response: Toradol relieved             

      patient's pain. The symptoms have resolved. Response to treatment: the patient's            

      symptoms have markedly improved after treatment, and as a result, I will discharge          

      patient. Special discussion: I discussed with the patient/guardian in detail that at        

      this point there is no indication for admission to the hospital. It is understood,          

      however, that if the symptoms persist or worsen the patient needs to return immediately     

      for re-evaluation.                                                                          

                                                                                                  

                                                                                             

20:43 Order name: Urine Dipstick--Ancillary (enter results)                                   mt  

                                                                                             

19:10 Order name: Urine Dipstick-Ancillary (obtain specimen); Complete Time: 20:39            tw4 

                                                                                             

19:10 Order name: Urine Pregnancy Test (obtain specimen); Complete Time: 20:39                tw4 

                                                                                                  

Administered Medications:                                                                         

                                                                                             

20:57 Drug: TORadol 60 mg Route: IM; Site: left gluteus;                                      mg2 

20:57 Follow up: Response: No adverse reaction; Medication administered at discharge.         mg2 

                                                                                                  

                                                                                                  

Disposition:                                                                                      

20 20:37 Discharged to Home. Impression: Dysmenorrhea, unspecified.                         

- Condition is Stable.                                                                            

- Discharge Instructions: Dysmenorrhea.                                                           

- Prescriptions for Ibuprofen 800 mg Oral Tablet - take 1 tablet by ORAL route every 8            

  hours As needed take with food; 30 tablet. Tramadol 50 mg Oral Tablet - take 1 tablet           

  by ORAL route every 8 hours as needed; 12 tablet.                                               

- Medication Reconciliation Form, Thank You Letter, Antibiotic Education, Prescription            

  Opioid Use, Work release form form.                                                             

- Follow up: Private Physician; When: Upon discharge from the Emergency Department;               

  Reason: Recheck today's complaints, Continuance of care.                                        

- Problem is new.                                                                                 

- Symptoms have improved.                                                                         

                                                                                                  

                                                                                                  

                                                                                                  

Signatures:                                                                                       

Dispatcher [employer]                           EDMS                                                 

Karla Holland RN                     RN   bb                                                   

Wade Chandler MD MD   tw4                                                  

Zane Alavrez RN                    RN   mg2                                                  

                                                                                                  

Corrections: (The following items were deleted from the chart)                                    

21:00 20:37 2020 20:37 Discharged to Home. Impression: Dysmenorrhea, unspecified.       mg2 

      Condition is Stable. Forms are Medication Reconciliation Form, Thank You Letter,            

      Antibiotic Education, Prescription Opioid Use. Follow up: Private Physician; When: Upon     

      discharge from the Emergency Department; Reason: Recheck today's complaints,                

      Continuance of care. Problem is new. Symptoms have improved. tw4                            

                                                                                                  

**************************************************************************************************

## 2020-03-09 NOTE — ER
Nurse's Notes                                                                                     

 [facility]                                                                 

Name: Eda Reyes                                                                              

Age: 39 yrs                                                                                       

Sex: Female                                                                                       

: 1980                                                                                   

MRN: [id number]                                                                                   

Arrival Date: 2020                                                                          

Time: 19:07                                                                                       

Account#: [id number]                                                                            

Bed 15                                                                                            

Private MD:                                                                                       

Diagnosis: Dysmenorrhea, unspecified                                                              

                                                                                                  

Presentation:                                                                                     

                                                                                             

19:35 Chief complaint: Patient states: she has been having irregular menstrual cycles for     bb  

      approx a year now but she missed work today because she cannot take the pain anymore        

      she has an appointment with Dr Quintanilla at [facility] but she would like something for the pain      

      and a work note until she can make it to her appointment. Coronavirus screen: The           

      patient has NOT traveled to a country currently being monitored by the CDC within the       

      last 14 days. Proceed with normal triage procedures. Ebola Screen: No symptoms or risks     

      identified at this time. Initial Sepsis Screen: Does the patient meet any 2 criteria?       

      No. Patient's initial sepsis screen is negative. Does the patient have a suspected          

      source of infection? No. Patient's initial sepsis screen is negative. Risk Assessment:      

      Do you want to hurt yourself or someone else? Patient reports no desire to harm self or     

      others. Onset of symptoms was 2020.                                               

19:35 Method Of Arrival: Ambulatory                                                           bb  

19:35 Acuity: PRAMOD 3                                                                           bb  

                                                                                                  

Triage Assessment:                                                                                

19:38 General: Appears in no apparent distress. Behavior is calm, cooperative. Pain:          bb  

      Complains of pain in abdomen Pain currently is 7 out of 10 on a pain scale. Neuro:          

      Level of Consciousness is awake, alert, obeys commands, Oriented to person, place,          

      time, situation. Cardiovascular: No deficits noted. Respiratory: Respiratory effort is      

      even, unlabored, Respiratory pattern is regular. : Reports vaginal bleeding that is       

      moderate flow. Derm: Skin is pink, warm \T\ dry. Musculoskeletal: Circulation, motion,      

      and sensation intact.                                                                       

                                                                                                  

OB/GYN:                                                                                           

19:38 LMP 3/9/2020                                                                            bb  

                                                                                                  

Historical:                                                                                       

- Allergies:                                                                                      

19:38 PENICILLINS;                                                                            bb  

- Home Meds:                                                                                      

19:38 None [Active];                                                                          bb  

- PMHx:                                                                                           

19:38 None;                                                                                   bb  

- PSHx:                                                                                           

19:38 Tubal ligation; ;                                                              bb  

                                                                                                  

- Immunization history:: Adult Immunizations up to date.                                          

- Social history:: Smoking status: Patient denies any tobacco usage or history of.                

                                                                                                  

                                                                                                  

Screenin:59 Abuse screen: Denies threats or abuse. Denies injuries from another. Nutritional        mg2 

      screening: No deficits noted. Tuberculosis screening: No symptoms or risk factors           

      identified. Fall Risk None identified.                                                      

                                                                                                  

Assessment:                                                                                       

20:30 General: Appears in no apparent distress. comfortable, Behavior is calm, cooperative.   mg2 

      Pain: Complains of pain in abdomen. : Reports menstrual pain.                             

                                                                                                  

Vital Signs:                                                                                      

19:35  / 79; Pulse 86; Resp 16 S; Temp 98.8(O); Pulse Ox 100% on R/A; Weight 63.5 kg    bb  

      (R); Height 5 ft. 3 in. (160.02 cm) (R); Pain 7/10;                                         

20:40  / 78; Pulse 80; Resp 18; Temp 98; Pulse Ox 100% on R/A;                          mg2 

19:35 Body Mass Index 24.80 (63.50 kg, 160.02 cm)                                               

                                                                                                  

ED Course:                                                                                        

19:07 Patient arrived in ED.                                                                  es  

19:38 Triage completed.                                                                       bb  

19:38 Arm band placed on Patient placed in waiting room, Patient notified of wait time.       bb  

20:00 Wade Chandler MD is Attending Physician.                                            tw4 

20:20 Zane Alvarez, KAREN is Primary Nurse.                                                  mg2 

20:40 Patient has correct armband on for positive identification.                             mg2 

20:58 No provider procedures requiring assistance completed. Patient did not have IV access   mg2 

      during this emergency room visit.                                                           

                                                                                                  

Administered Medications:                                                                         

20:57 Drug: TORadol 60 mg Route: IM; Site: left gluteus;                                      mg2 

20:57 Follow up: Response: No adverse reaction; Medication administered at discharge.         mg2 

                                                                                                  

                                                                                                  

Outcome:                                                                                          

20:37 Discharge ordered by MD.                                                                tw4 

21:00 Discharged to home ambulatory.                                                          mg2 

21:00 Condition: stable                                                                           

21:00 Discharge instructions given to patient, Instructed on discharge instructions, follow       

      up and referral plans. medication usage, Demonstrated understanding of instructions,        

      follow-up care, medications, Prescriptions given X 2.                                       

21:00 Patient left the ED.                                                                    mg2 

                                                                                                  

Signatures:                                                                                       

Kenia Hernandez Brenda, RN                     RN                                                      

Wade Chandler MD MD   [facility]                                                  

Zane Alvarez, KAREN                    RN   mg2                                                  

                                                                                                  

**************************************************************************************************

## 2023-01-05 NOTE — EDPHYS
Body Location Override (Optional): left lower back Physician Documentation                                                                           

 [facility]                                                                 

Name: Eda Reyes                                                                              

Age: 39 yrs                                                                                       

Sex: Female                                                                                       

: 1980                                                                                   

MRN: [id number]                                                                                   

Arrival Date: 2020                                                                          

Time: 14:08                                                                                       

Account#: [id number]                                                                            

Bed 24                                                                                            

Private MD:                                                                                       

ED Physician Kayode Tran                                                                         

HPI:                                                                                              

                                                                                             

14:20 This 39 yrs old  Female presents to ER via Un[facility] with complaints of       kb  

      Congestion, Vomiting/Diarrhea.                                                              

14:22 The patient or guardian reports cough, that is intermittent, described as moderate,     kb  

      with productive sputum, flu symptoms. Onset: The symptoms/episode began/occurred 3          

      day(s) ago. Severity of symptoms: At their worst the symptoms were mild, in the             

      emergency department the symptoms are unchanged. Modifying factors: The symptoms are        

      alleviated by nothing, the symptoms are aggravated by nothing. Associated signs and         

      symptoms: Pertinent positives: vomiting. The patient has not experienced similar            

      symptoms in the past. The patient has not recently seen a physician. Pt reports she has     

      had cough and congestion for 3-4 days. States she had post-tussive vomiting once last       

      night and didn't go to work today. "I work at [employer] and they said I have to be            

      checked out before I can return to work is all it is.".                                     

                                                                                                  

OB/GYN:                                                                                           

15:03 lmp -unknown                                                                            mg2 

                                                                                                  

Historical:                                                                                       

- Allergies:                                                                                      

14:23 PENICILLINS;                                                                            ss  

- Home Meds:                                                                                      

14:23 None [Active];                                                                          ss  

- PMHx:                                                                                           

14:23 None;                                                                                   ss  

- PSHx:                                                                                           

14:23 Tubal ligation; ;                                                              ss  

                                                                                                  

- Immunization history:: Adult Immunizations up to date.                                          

- Social history:: Smoking status: Patient/guardian denies using tobacco, the patient             

  reports quitting approximately 3 years ago.                                                     

- Ebola Screening: : Patient denies exposure to infectious person Patient denies travel           

  to an Ebola-affected area in the 21 days before illness onset.                                  

                                                                                                  

                                                                                                  

ROS:                                                                                              

14:21 Constitutional: Negative for fever, chills, and weight loss, ENT: Negative for injury,  kb  

      pain, and discharge, Neck: Negative for injury, pain, and swelling, Cardiovascular:         

      Negative for chest pain, palpitations, and edema, Back: Negative for injury and pain,       

      : Negative for injury, bleeding, discharge, and swelling, MS/Extremity: Negative for      

      injury and deformity, Skin: Negative for injury, rash, and discoloration, Neuro:            

      Negative for headache, weakness, numbness, tingling, and seizure.                           

14:21 Respiratory: Positive for cough.                                                            

14:21 Abdomen/GI: Positive for vomiting.                                                          

                                                                                                  

Exam:                                                                                             

14:21 Constitutional:  This is a well developed, well nourished patient who is awake, alert,  kb  

      and in no acute distress. Head/Face:  Normocephalic, atraumatic. ENT:  Nares patent. No     

      nasal discharge, no septal abnormalities noted.  Tympanic membranes are normal and          

      external auditory canals are clear.  Oropharynx with no redness, swelling, or masses,       

      exudates, or evidence of obstruction, uvula midline.  Mucous membranes moist. Neck:         

      Trachea midline, no thyromegaly or masses palpated, and no cervical lymphadenopathy.        

      Supple, full range of motion without nuchal rigidity, or vertebral point tenderness.        

      No Meningismus. Chest/axilla:  Normal chest wall appearance and motion.  Nontender with     

      no deformity.  No lesions are appreciated. Cardiovascular:  Regular rate and rhythm         

      with a normal S1 and S2.  No gallops, murmurs, or rubs.  Normal PMI, no JVD.  No pulse      

      deficits. Respiratory:  Lungs have equal breath sounds bilaterally, clear to                

      auscultation and percussion.  No rales, rhonchi or wheezes noted.  No increased work of     

      breathing, no retractions or nasal flaring. Abdomen/GI:  Soft, non-tender, with normal      

      bowel sounds.  No distension or tympany.  No guarding or rebound.  No evidence of           

      tenderness throughout. Skin:  Warm, dry with normal turgor.  Normal color with no           

      rashes, no lesions, and no evidence of cellulitis. MS/ Extremity:  Pulses equal, no         

      cyanosis.  Neurovascular intact.  Full, normal range of motion. Neuro:  Awake and           

      alert, GCS 15, oriented to person, place, time, and situation.  Cranial nerves II-XII       

      grossly intact.  Motor strength 5/5 in all extremities.  Sensory grossly intact.            

      Cerebellar exam normal.  Normal gait.                                                       

                                                                                                  

Vital Signs:                                                                                      

14:23  / 86; Resp 18; Temp 98.6; Pulse Ox 100% ;                                        mg2 

15:02  / 62; Pulse 78; Resp 18; Temp 98.5; Pulse Ox 100% on R/A;                        mg2 

                                                                                                  

MDM:                                                                                              

14:16 Patient medically screened.                                                             kb  

14:21 Data reviewed: vital signs, nurses notes. Data interpreted: Pulse oximetry: on room air kb  

      is 100 %. Interpretation: normal. Counseling: I had a detailed discussion with the          

      patient and/or guardian regarding: the historical points, exam findings, and any            

      diagnostic results supporting the discharge/admit diagnosis, lab results, the need for      

      outpatient follow up, a family practitioner, to return to the emergency department if       

      symptoms worsen or persist or if there are any questions or concerns that arise at home.    

                                                                                                  

                                                                                             

14:17 Order name: Flu; Complete Time: 14:55                                                   kb  

                                                                                                  

Administered Medications:                                                                         

No medications were administered                                                                  

                                                                                                  

                                                                                                  

Disposition:                                                                                      

16:22 Co-signature as Attending Physician, Kayode Tran MD.                                    rn  

                                                                                                  

Disposition:                                                                                      

20 14:56 Discharged to Home. Impression: Acute upper respiratory infection, unspecified.    

- Condition is Stable.                                                                            

- Discharge Instructions: Upper Respiratory Infection, Adult, Easy-to-Read, Viral                 

  Respiratory Infection, Easy-To-Read.                                                            

                                                                                                  

- Work release form, Medication Reconciliation Form, Thank You Letter, Antibiotic                 

  Education, Prescription Opioid Use form.                                                        

- Follow up: Emergency Department; When: As needed; Reason: Worsening of condition.               

  Follow up: Private Physician; When: 2 - 3 days; Reason: Recheck today's complaints,             

  Continuance of care, Re-evaluation by your physician.                                           

                                                                                                  

                                                                                                  

                                                                                                  

Signatures:                                                                                       

Dispatcher [employer]                           EDMS                                                 

Rosy Mccray, FNP-C                 FNP-Ckb                                                   

Kayode Tran MD MD rn Smirch, Shelby, RN RN ss Gardose, Michele, RN                    RN   mg2                                                  

                                                                                                  

Corrections: (The following items were deleted from the chart)                                    

15:04 14:56 2020 14:56 Discharged to Home. Impression: Acute upper respiratory          mg2 

      infection, unspecified. Condition is Stable. Forms are Medication Reconciliation Form,      

      Thank You Letter, Antibiotic Education, Prescription Opioid Use. Follow up: Emergency       

      Department; When: As needed; Reason: Worsening of condition. Follow up: Private             

      Physician; When: 2 - 3 days; Reason: Recheck today's complaints, Continuance of care,       

      Re-evaluation by your physician. kb                                                         

                                                                                                  

************************************************************************************************** Detail Level: Detailed Add 63015 Cpt? (Important Note: In 2017 The Use Of 55805 Is Being Tracked By Cms To Determine Future Global Period Reimbursement For Global Periods): yes Wound Evaluated By (Optional): Smiley AMIN Wound Diameter In Cm(Optional): 0 Wound Crusting?: clean Sutures?: intact Wound Edema?: mild Wound Color?: pink